# Patient Record
Sex: MALE | Race: BLACK OR AFRICAN AMERICAN | NOT HISPANIC OR LATINO | Employment: STUDENT | URBAN - METROPOLITAN AREA
[De-identification: names, ages, dates, MRNs, and addresses within clinical notes are randomized per-mention and may not be internally consistent; named-entity substitution may affect disease eponyms.]

---
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
## 2025-01-15 ENCOUNTER — OFFICE VISIT (OUTPATIENT)
Dept: FAMILY MEDICINE | Facility: CLINIC | Age: 20
End: 2025-01-15
Payer: COMMERCIAL

## 2025-01-15 VITALS
HEART RATE: 107 BPM | OXYGEN SATURATION: 97 % | SYSTOLIC BLOOD PRESSURE: 102 MMHG | TEMPERATURE: 101 F | BODY MASS INDEX: 38.93 KG/M2 | DIASTOLIC BLOOD PRESSURE: 58 MMHG | HEIGHT: 65 IN | RESPIRATION RATE: 18 BRPM | WEIGHT: 233.69 LBS

## 2025-01-15 DIAGNOSIS — J06.9 ACUTE UPPER RESPIRATORY INFECTION: ICD-10-CM

## 2025-01-15 DIAGNOSIS — J10.1 INFLUENZA A: Primary | ICD-10-CM

## 2025-01-15 LAB
CTP QC/QA: YES
CTP QC/QA: YES
POC MOLECULAR INFLUENZA A AGN: POSITIVE
POC MOLECULAR INFLUENZA B AGN: NEGATIVE
SARS-COV-2 RDRP RESP QL NAA+PROBE: NEGATIVE

## 2025-01-15 PROCEDURE — 99203 OFFICE O/P NEW LOW 30 MIN: CPT | Mod: S$GLB,,, | Performed by: FAMILY MEDICINE

## 2025-01-15 PROCEDURE — 87502 INFLUENZA DNA AMP PROBE: CPT | Mod: QW,S$GLB,, | Performed by: FAMILY MEDICINE

## 2025-01-15 PROCEDURE — 87635 SARS-COV-2 COVID-19 AMP PRB: CPT | Mod: QW,S$GLB,, | Performed by: FAMILY MEDICINE

## 2025-01-15 PROCEDURE — 99999 PR PBB SHADOW E&M-EST. PATIENT-LVL IV: CPT | Mod: PBBFAC,,, | Performed by: FAMILY MEDICINE

## 2025-01-15 RX ORDER — OSELTAMIVIR PHOSPHATE 75 MG/1
75 CAPSULE ORAL 2 TIMES DAILY
Qty: 10 CAPSULE | Refills: 0 | Status: SHIPPED | OUTPATIENT
Start: 2025-01-15 | End: 2025-01-20

## 2025-01-15 NOTE — PATIENT INSTRUCTIONS
"Get behind the counter store brand of "Claritin-D and use as needed for congestion     Push fluids intake.  Drink plenty of water.     Contact your PCP if any worsening or for any new concerns as we discussed.  "

## 2025-01-15 NOTE — LETTER
January 15, 2025      [facility]  [address] [place] [address]  Phone: [phone]  Fax: [phone]       Patient: Chase Lares   YOB: 2005  Date of Visit: 01/15/2025    To Whom It May Concern:    Elvia Lares  was at [facility] on 01/15/2025. The patient may return to school on 1/17/2025. If you have any questions or concerns, or if I can be of further assistance, please do not hesitate to contact me.    Sincerely,    Prudencio Lal MD

## 2025-01-15 NOTE — PROGRESS NOTES
"Subjective:       Patient ID: Chase Lares is a 19 y.o. male.    Chief Complaint: No chief complaint on file.    New to me patient here for UC visit.  Day 2-3 of ST, chills and cough.  Flu A positive and Covid negative.      Review of Systems   Constitutional:  Positive for fatigue. Negative for fever.   HENT:  Positive for congestion and sore throat.    Respiratory:  Positive for cough. Negative for shortness of breath.    Cardiovascular:  Negative for chest pain.   Gastrointestinal:  Negative for abdominal pain and nausea.   Skin:  Negative for rash.   Neurological:  Negative for numbness.   All other systems reviewed and are negative.      Objective:      Physical Exam  Vitals reviewed.   Constitutional:       General: He is not in acute distress.     Appearance: He is well-developed.   HENT:      Right Ear: Tympanic membrane normal.      Left Ear: Tympanic membrane normal.      Nose: Mucosal edema present.      Mouth/Throat:      Pharynx: Posterior oropharyngeal erythema present.   Cardiovascular:      Rate and Rhythm: Normal rate and regular rhythm.      Heart sounds: No murmur heard.  Pulmonary:      Effort: Pulmonary effort is normal.      Breath sounds: Normal breath sounds. No wheezing or rales.   Musculoskeletal:      Cervical back: Neck supple.   Lymphadenopathy:      Cervical: No cervical adenopathy.   Skin:     General: Skin is warm and dry.         Assessment:       1. Influenza A    2. Acute upper respiratory infection        Plan:       Influenza A  -     oseltamivir (TAMIFLU) 75 MG capsule; Take 1 capsule (75 mg total) by mouth 2 (two) times daily. for 10 doses  Dispense: 10 capsule; Refill: 0    Acute upper respiratory infection  -     POCT COVID-19 Rapid Screening  -     POCT Influenza A/B Molecular      Patient Instructions   Get behind the counter store brand of "Claritin-D and use as needed for congestion     Push fluids intake.  Drink plenty of water.     Contact your PCP if any " worsening or for any new concerns as we discussed.

## 2025-02-04 ENCOUNTER — ATHLETIC TRAINING SESSION (OUTPATIENT)
Dept: SPORTS MEDICINE | Facility: CLINIC | Age: 20
End: 2025-02-04
Payer: COMMERCIAL

## 2025-02-04 DIAGNOSIS — S80.11XA CONTUSION OF RIGHT TIBIA: Primary | ICD-10-CM

## 2025-02-04 NOTE — PROGRESS NOTES
Reason for Encounter New Injury    Subjective:       Chief Complaint: Chase Lares is a 19 y.o. male student at [employer] who had concerns including Injury, Numbness, Pain, and Swelling of the Right Lower Leg (Chase Lares is a 18 y.o. [employer]  who presents to [employer] AT on Wed. 11/20/24 for evaluation of right shin injury after collision with fellow [employer]  @ practice. Cam was running and kicked the ball as another player was attempting to kick the ball. AT applied ice and recommended ibuprofen once Cam returned home. Held out of practice until after Thanksgiving Break on 12/2/2024.Pain is 7/10.He is able to ambulate. Swelling and tenderness present.No deformity or lace).    Handedness: right-handed  Sport played: soccer      Level: high school      Position:defense      Injury  This is a new problem. The current episode started more than 1 month ago. The problem has been gradually improving.   Pain    Swelling        ROS              Objective:       General: Chase is well-developed, well-nourished, appears stated age, in no acute distress, alert and oriented to time, place and person.     AT Session  Chase Lares is a 18 y.o. [employer]  who presents to [employer] AT on Wed. 11/20/24 for evaluation of right shin injury after collision with fellow [employer]  @ practice. Cam was running and kicked the ball as another player was attempting to kick the ball. AT applied ice and recommended ibuprofen once Cam returned home. Held out of practice until after Thanksgiving Break on 12/2/2024.Pain is 7/10.He is able to ambulate. Swelling and tenderness present.No deformity or lacerations.Bruising, mild swelling, tenderness to touch on right shin.         Assessment:     Status: O - Out    Date Seen:  11/20/2024    Date of Injury:  11/20/2024    Date Out:  11/20/2024    Date Cleared:   12/02/2024        Treatment/Rehab/Maintenance:   Chase Lares is a 18 y.o. [employer]  who presents to [facility] AT on Wed. 11/20/24 for evaluation of right shin injury after collision with fellow [employer]  @ practice. Cam was running and kicked the ball as another player was attempting to kick the ball. AT applied ice and recommended ibuprofen once Cam returned home. Held out of practice until after Thanksgiving Break on 12/2/2024.Pain is 7/10.He is able to ambulate. Swelling and tenderness present.No deformity or lacerations.Bruising, mild swelling, tenderness to touch on right shin.        Plan:       1.  Referral for XRay  2. Physician Referral: yes  3. ED Referral:no  4. Parent/Guardian Notified: No  5. All questions were answered, ath. will contact me for questions or concerns in  the interim.  6.         Eligible to use School Insurance: No, school does not have insurance plan

## 2025-04-15 ENCOUNTER — OFFICE VISIT (OUTPATIENT)
Dept: FAMILY MEDICINE | Facility: CLINIC | Age: 20
End: 2025-04-15
Payer: COMMERCIAL

## 2025-04-15 VITALS
SYSTOLIC BLOOD PRESSURE: 132 MMHG | RESPIRATION RATE: 16 BRPM | BODY MASS INDEX: 39.8 KG/M2 | TEMPERATURE: 99 F | WEIGHT: 239.19 LBS | HEART RATE: 75 BPM | OXYGEN SATURATION: 98 % | DIASTOLIC BLOOD PRESSURE: 80 MMHG

## 2025-04-15 DIAGNOSIS — J31.0 CHRONIC RHINITIS: ICD-10-CM

## 2025-04-15 DIAGNOSIS — J45.20 MILD INTERMITTENT ASTHMA WITHOUT COMPLICATION: ICD-10-CM

## 2025-04-15 DIAGNOSIS — H93.8X1 EAR FULLNESS, RIGHT: ICD-10-CM

## 2025-04-15 DIAGNOSIS — Z00.00 HEALTHCARE MAINTENANCE: Primary | ICD-10-CM

## 2025-04-15 PROCEDURE — 99213 OFFICE O/P EST LOW 20 MIN: CPT | Mod: 25,S$GLB,, | Performed by: FAMILY MEDICINE

## 2025-04-15 PROCEDURE — 99999 PR PBB SHADOW E&M-EST. PATIENT-LVL III: CPT | Mod: PBBFAC,,, | Performed by: FAMILY MEDICINE

## 2025-04-15 PROCEDURE — 99385 PREV VISIT NEW AGE 18-39: CPT | Mod: S$GLB,,, | Performed by: FAMILY MEDICINE

## 2025-04-15 RX ORDER — FLUTICASONE PROPIONATE 50 MCG
1 SPRAY, SUSPENSION (ML) NASAL DAILY
Qty: 16 G | Refills: 11 | Status: SHIPPED | OUTPATIENT
Start: 2025-04-15

## 2025-04-15 RX ORDER — ALBUTEROL SULFATE 90 UG/1
2 INHALANT RESPIRATORY (INHALATION) EVERY 6 HOURS PRN
Qty: 18 G | Refills: 0 | Status: SHIPPED | OUTPATIENT
Start: 2025-04-15 | End: 2026-04-15

## 2025-04-15 NOTE — PROGRESS NOTES
Subjective:   Patient ID: Chase Lares is a 19 y.o. male     Chief Complaint: Checkup, Ear fullness    Here for checkup and to establish care. Notes right ear fulness. Occurs intermittently. Also notes sinus congestion intermittently.     Review of Systems   Constitutional:  Negative for chills and fever.   HENT:  Positive for congestion. Negative for sore throat, tinnitus and trouble swallowing.    Respiratory:  Negative for cough and shortness of breath.    Cardiovascular:  Negative for chest pain and leg swelling.   Gastrointestinal:  Negative for abdominal distention and abdominal pain.   Genitourinary:  Negative for dysuria and flank pain.   Musculoskeletal:  Negative for arthralgias and back pain.   Skin:  Negative for color change and pallor.   Neurological:  Negative for weakness and headaches.   Psychiatric/Behavioral:  Negative for agitation and confusion.      Past Medical History:   Diagnosis Date    Asthma     2 flares / annually    Heart murmur     Still's    Otitis media      Past Surgical History:   Procedure Laterality Date    TYMPANOSTOMY TUBE PLACEMENT       Objective:     Vitals:    04/15/25 0924   BP: 132/80   Pulse: 75   Resp: 16   Temp: 99 °F (37.2 °C)     Body mass index is 39.8 kg/m².  Physical Exam  Vitals and nursing note reviewed.   Constitutional:       Appearance: He is well-developed.   HENT:      Head: Normocephalic and atraumatic.      Right Ear: No middle ear effusion. There is no impacted cerumen. Tympanic membrane is not injected, scarred or erythematous.      Left Ear:  No middle ear effusion. There is no impacted cerumen. Tympanic membrane is not injected, scarred or erythematous.   Eyes:      General: No scleral icterus.     Conjunctiva/sclera: Conjunctivae normal.   Cardiovascular:      Heart sounds: No murmur heard.  Pulmonary:      Effort: Pulmonary effort is normal. No respiratory distress.   Musculoskeletal:         General: No deformity. Normal range of  motion.      Cervical back: Normal range of motion and neck supple.   Skin:     Coloration: Skin is not pale.      Findings: No rash.   Neurological:      Mental Status: He is alert and oriented to person, place, and time.   Psychiatric:         Behavior: Behavior normal.         Thought Content: Thought content normal.         Judgment: Judgment normal.     Assessment:     1. Healthcare maintenance    2. Chronic rhinitis      Plan:   Healthcare maintenance  -     CBC Auto Differential; Future; Expected date: 04/15/2025  -     Comprehensive Metabolic Panel; Future; Expected date: 04/15/2025  -     Hemoglobin A1C; Future; Expected date: 04/15/2025  -     Lipid Panel; Future; Expected date: 04/15/2025    Chronic rhinitis  -     fluticasone propionate (FLONASE) 50 mcg/actuation nasal spray; 1 spray (50 mcg total) by Each Nostril route once daily.  Dispense: 16 g; Refill: 11  Counseled. Takes OTC antihistamine  Ear fullness, right  Discussed examination findings. Discussed how hearing loss can be transient with fluid buildup from blockage of eustachian tube. Discussed treatments. Offered referral to ENT although noted exam was benign at this time. Declined referral.  Mild intermittent asthma without complication  -     albuterol (PROVENTIL HFA) 90 mcg/actuation inhaler; Inhale 2 puffs into the lungs every 6 (six) hours as needed for Wheezing. Rescue  Dispense: 18 g; Refill: 0      Chronic condition not otherwise mentioned is stable      Total time spent of Greater than 30 minutes minutes on the day of the visit.This includes face to face time and preparing to see the patient, obtaining and reviewing separately obtained history, documenting clinical information in the electronic or other health record, independently interpreting results and communicating results to the patient/family/caregiver, or care coordinator.    Established patient with me has been instructed that must see me at least 1 time yearly (every 365 days)  for refills of medications. Seeing other providers in this clinic is fine but expectation is to see me yearly.    Sal Roblero MD  04/15/2025    Portions of this note have been dictated with FADIA Flores.

## 2025-05-13 DIAGNOSIS — J45.20 MILD INTERMITTENT ASTHMA WITHOUT COMPLICATION: ICD-10-CM

## 2025-05-13 RX ORDER — ALBUTEROL SULFATE 90 UG/1
2 INHALANT RESPIRATORY (INHALATION) EVERY 6 HOURS PRN
Qty: 54 G | Refills: 3 | Status: SHIPPED | OUTPATIENT
Start: 2025-05-13

## 2025-05-13 NOTE — TELEPHONE ENCOUNTER
No care due was identified.  [facility] Embedded Care Due Messages. Reference number: [id number].   5/13/2025 12:15:42 AM CDT

## 2025-05-13 NOTE — TELEPHONE ENCOUNTER
Refill Decision Note   Chasewali Lares  is requesting a refill authorization.  Brief Assessment and Rationale for Refill:  Approve     Medication Therapy Plan:         Comments:     Note composed:7:39 AM 05/13/2025